# Patient Record
Sex: MALE | Race: WHITE | Employment: STUDENT | URBAN - METROPOLITAN AREA
[De-identification: names, ages, dates, MRNs, and addresses within clinical notes are randomized per-mention and may not be internally consistent; named-entity substitution may affect disease eponyms.]

---

## 2017-12-15 ENCOUNTER — OFFICE VISIT (OUTPATIENT)
Dept: PRIMARY CARE CLINIC | Age: 22
End: 2017-12-15

## 2017-12-15 VITALS
WEIGHT: 179 LBS | RESPIRATION RATE: 16 BRPM | TEMPERATURE: 97.9 F | DIASTOLIC BLOOD PRESSURE: 84 MMHG | HEIGHT: 72 IN | BODY MASS INDEX: 24.24 KG/M2 | SYSTOLIC BLOOD PRESSURE: 126 MMHG | OXYGEN SATURATION: 99 % | HEART RATE: 102 BPM

## 2017-12-15 DIAGNOSIS — J02.9 SORE THROAT: ICD-10-CM

## 2017-12-15 DIAGNOSIS — J01.00 SUBACUTE MAXILLARY SINUSITIS: Primary | ICD-10-CM

## 2017-12-15 LAB — S PYO AG THROAT QL: NORMAL

## 2017-12-15 PROCEDURE — G8482 FLU IMMUNIZE ORDER/ADMIN: HCPCS | Performed by: FAMILY MEDICINE

## 2017-12-15 PROCEDURE — 87880 STREP A ASSAY W/OPTIC: CPT | Performed by: FAMILY MEDICINE

## 2017-12-15 PROCEDURE — G8420 CALC BMI NORM PARAMETERS: HCPCS | Performed by: FAMILY MEDICINE

## 2017-12-15 PROCEDURE — 99213 OFFICE O/P EST LOW 20 MIN: CPT | Performed by: FAMILY MEDICINE

## 2017-12-15 PROCEDURE — 4004F PT TOBACCO SCREEN RCVD TLK: CPT | Performed by: FAMILY MEDICINE

## 2017-12-15 PROCEDURE — G8427 DOCREV CUR MEDS BY ELIG CLIN: HCPCS | Performed by: FAMILY MEDICINE

## 2017-12-15 RX ORDER — AZITHROMYCIN 250 MG/1
TABLET, FILM COATED ORAL
Qty: 6 TABLET | Refills: 0 | Status: SHIPPED | OUTPATIENT
Start: 2017-12-15

## 2017-12-15 ASSESSMENT — ENCOUNTER SYMPTOMS
CONSTIPATION: 0
RESPIRATORY NEGATIVE: 1
ABDOMINAL PAIN: 0
CHANGE IN BOWEL HABIT: 0
SINUS PRESSURE: 1
BACK PAIN: 0
SWOLLEN GLANDS: 0
NAUSEA: 0
SORE THROAT: 1
SHORTNESS OF BREATH: 0
EYE DISCHARGE: 0
COUGH: 0
GASTROINTESTINAL NEGATIVE: 1
BLOOD IN STOOL: 0
VOMITING: 0
DIARRHEA: 0
EYES NEGATIVE: 1
APNEA: 0
PHOTOPHOBIA: 0
CHEST TIGHTNESS: 0
VISUAL CHANGE: 0

## 2017-12-15 ASSESSMENT — PATIENT HEALTH QUESTIONNAIRE - PHQ9
SUM OF ALL RESPONSES TO PHQ QUESTIONS 1-9: 0
SUM OF ALL RESPONSES TO PHQ9 QUESTIONS 1 & 2: 0
1. LITTLE INTEREST OR PLEASURE IN DOING THINGS: 0
2. FEELING DOWN, DEPRESSED OR HOPELESS: 0

## 2017-12-15 NOTE — PROGRESS NOTES
Subjective:      Patient ID: Codie Osman is a 25 y.o. male who presents today for:  Chief Complaint   Patient presents with    Pharyngitis     x 2 days pt has c/o a extreme  sore throat,sores on jadon back of his throat, sinus pressure and a headache       Pharyngitis   This is a new problem. The current episode started in the past 7 days. The problem occurs constantly. The problem has been gradually worsening. Associated symptoms include headaches and a sore throat. Pertinent negatives include no abdominal pain, anorexia, arthralgias, change in bowel habit, chest pain, chills, congestion, coughing, diaphoresis, fatigue, fever, joint swelling, myalgias, nausea, neck pain, numbness, rash, swollen glands, urinary symptoms, vertigo, visual change, vomiting or weakness. Associated symptoms comments: Sore on the back of his throat and sinus pressure. He has tried nothing for the symptoms. Past Medical History:   Diagnosis Date    Acne 8/13/2012    Allergic rhinitis     Allergic rhinitis 4/16/2012    Bronchitis 8/13/2012    Contusion, toe 12/6/2012    Cough 8/13/2012    Depression 11/8/2013    Fatigue 11/8/2013    Headache(784.0) 9/18/2012    Impetigo 4/26/2012    Myalgia 9/18/2012    Pain in toe of right foot 12/6/2012    Right foot pain 12/6/2012    Right foot pain, 1st MT-p 12/6/2012    Sinusitis 12/18/2014    Sports physical 11/8/2013    Tobacco abuse 6/30/2015     History reviewed. No pertinent surgical history. Family History   Problem Relation Age of Onset    Allergies Mother      Social History     Social History    Marital status: Single     Spouse name: N/A    Number of children: N/A    Years of education: N/A     Occupational History    Not on file.      Social History Main Topics    Smoking status: Current Every Day Smoker    Smokeless tobacco: Never Used    Alcohol use No    Drug use: No    Sexual activity: Not on file     Other Topics Concern    Not on file     Social History Narrative    ** Merged History Encounter **          Allergies:  Review of patient's allergies indicates no known allergies. Review of Systems   Constitutional: Negative. Negative for activity change, appetite change, chills, diaphoresis, fatigue and fever. HENT: Positive for sinus pressure and sore throat. Negative for congestion, nosebleeds and tinnitus. Eyes: Negative. Negative for photophobia, discharge and visual disturbance. Respiratory: Negative. Negative for apnea, cough, chest tightness and shortness of breath. Cardiovascular: Negative. Negative for chest pain and palpitations. Gastrointestinal: Negative. Negative for abdominal pain, anorexia, blood in stool, change in bowel habit, constipation, diarrhea, nausea and vomiting. Genitourinary: Negative. Negative for dysuria, frequency, hematuria and urgency. Musculoskeletal: Negative. Negative for arthralgias, back pain, joint swelling, myalgias and neck pain. Skin: Negative. Negative for pallor and rash. Neurological: Positive for headaches. Negative for dizziness, vertigo, syncope, speech difficulty, weakness, light-headedness and numbness. Psychiatric/Behavioral: Negative. Objective:   /84 (Site: Left Arm, Position: Sitting, Cuff Size: Medium Adult)   Pulse 102   Temp 97.9 °F (36.6 °C) (Tympanic)   Resp 16   Ht 6' (1.829 m)   Wt 179 lb (81.2 kg)   SpO2 99%   BMI 24.28 kg/m²     Physical Exam   Constitutional: He is oriented to person, place, and time. He appears well-developed and well-nourished. HENT:   Head: Normocephalic and atraumatic. Nose: Mucosal edema and rhinorrhea present. No epistaxis. Mouth/Throat: Posterior oropharyngeal erythema present. No oropharyngeal exudate. Eyes: Conjunctivae and EOM are normal. Pupils are equal, round, and reactive to light. Neck: Normal range of motion. Neck supple. No JVD present. No thyromegaly present.    Cardiovascular: Normal rate, regular

## 2017-12-17 LAB — THROAT CULTURE: NORMAL
